# Patient Record
Sex: FEMALE | Race: WHITE | URBAN - METROPOLITAN AREA
[De-identification: names, ages, dates, MRNs, and addresses within clinical notes are randomized per-mention and may not be internally consistent; named-entity substitution may affect disease eponyms.]

---

## 2019-11-26 ENCOUNTER — OFFICE VISIT (OUTPATIENT)
Dept: INTERNAL MEDICINE CLINIC | Age: 29
End: 2019-11-26
Payer: COMMERCIAL

## 2019-11-26 VITALS
WEIGHT: 189 LBS | SYSTOLIC BLOOD PRESSURE: 130 MMHG | DIASTOLIC BLOOD PRESSURE: 100 MMHG | BODY MASS INDEX: 31.49 KG/M2 | HEIGHT: 65 IN

## 2019-11-26 DIAGNOSIS — E28.2 PCOS (POLYCYSTIC OVARIAN SYNDROME): ICD-10-CM

## 2019-11-26 DIAGNOSIS — B07.0 PLANTAR WARTS: ICD-10-CM

## 2019-11-26 DIAGNOSIS — E87.6 HYPOKALEMIA: ICD-10-CM

## 2019-11-26 DIAGNOSIS — E83.42 HYPOMAGNESEMIA: ICD-10-CM

## 2019-11-26 DIAGNOSIS — F41.9 ANXIETY: Primary | ICD-10-CM

## 2019-11-26 LAB
ANION GAP SERPL CALCULATED.3IONS-SCNC: 12 MMOL/L (ref 3–16)
BUN BLDV-MCNC: 10 MG/DL (ref 7–20)
CALCIUM SERPL-MCNC: 9.7 MG/DL (ref 8.3–10.6)
CHLORIDE BLD-SCNC: 105 MMOL/L (ref 99–110)
CO2: 25 MMOL/L (ref 21–32)
CREAT SERPL-MCNC: 0.7 MG/DL (ref 0.6–1.1)
GFR AFRICAN AMERICAN: >60
GFR NON-AFRICAN AMERICAN: >60
GLUCOSE BLD-MCNC: 91 MG/DL (ref 70–99)
MAGNESIUM: 1.9 MG/DL (ref 1.8–2.4)
POTASSIUM SERPL-SCNC: 4.4 MMOL/L (ref 3.5–5.1)
SODIUM BLD-SCNC: 142 MMOL/L (ref 136–145)

## 2019-11-26 PROCEDURE — G8482 FLU IMMUNIZE ORDER/ADMIN: HCPCS | Performed by: INTERNAL MEDICINE

## 2019-11-26 PROCEDURE — 1036F TOBACCO NON-USER: CPT | Performed by: INTERNAL MEDICINE

## 2019-11-26 PROCEDURE — G8417 CALC BMI ABV UP PARAM F/U: HCPCS | Performed by: INTERNAL MEDICINE

## 2019-11-26 PROCEDURE — G8427 DOCREV CUR MEDS BY ELIG CLIN: HCPCS | Performed by: INTERNAL MEDICINE

## 2019-11-26 PROCEDURE — 99203 OFFICE O/P NEW LOW 30 MIN: CPT | Performed by: INTERNAL MEDICINE

## 2019-11-26 RX ORDER — FLUOXETINE HYDROCHLORIDE 20 MG/1
20 CAPSULE ORAL DAILY
Qty: 14 CAPSULE | Refills: 0 | Status: SHIPPED | OUTPATIENT
Start: 2019-11-26 | End: 2020-01-30 | Stop reason: SINTOL

## 2019-11-26 RX ORDER — FLUOXETINE HYDROCHLORIDE 40 MG/1
40 CAPSULE ORAL DAILY
Qty: 30 CAPSULE | Refills: 3 | Status: SHIPPED | OUTPATIENT
Start: 2019-11-26 | End: 2020-01-30 | Stop reason: SINTOL

## 2019-11-26 ASSESSMENT — PATIENT HEALTH QUESTIONNAIRE - PHQ9
SUM OF ALL RESPONSES TO PHQ QUESTIONS 1-9: 0
1. LITTLE INTEREST OR PLEASURE IN DOING THINGS: 0
2. FEELING DOWN, DEPRESSED OR HOPELESS: 0
SUM OF ALL RESPONSES TO PHQ9 QUESTIONS 1 & 2: 0
SUM OF ALL RESPONSES TO PHQ QUESTIONS 1-9: 0

## 2020-01-30 ENCOUNTER — OFFICE VISIT (OUTPATIENT)
Dept: DERMATOLOGY | Age: 30
End: 2020-01-30
Payer: COMMERCIAL

## 2020-01-30 ENCOUNTER — OFFICE VISIT (OUTPATIENT)
Dept: INTERNAL MEDICINE CLINIC | Age: 30
End: 2020-01-30
Payer: COMMERCIAL

## 2020-01-30 VITALS
WEIGHT: 168 LBS | DIASTOLIC BLOOD PRESSURE: 80 MMHG | BODY MASS INDEX: 27.99 KG/M2 | SYSTOLIC BLOOD PRESSURE: 102 MMHG | HEIGHT: 65 IN

## 2020-01-30 PROBLEM — Z12.83 SKIN CANCER SCREENING: Status: ACTIVE | Noted: 2020-01-30

## 2020-01-30 PROBLEM — R52 TENDERNESS: Status: ACTIVE | Noted: 2020-01-30

## 2020-01-30 PROBLEM — B07.9 VERRUCA VULGARIS: Status: ACTIVE | Noted: 2020-01-30

## 2020-01-30 PROBLEM — D22.9 MULTIPLE BENIGN MELANOCYTIC NEVI: Status: ACTIVE | Noted: 2020-01-30

## 2020-01-30 PROCEDURE — 99214 OFFICE O/P EST MOD 30 MIN: CPT | Performed by: INTERNAL MEDICINE

## 2020-01-30 PROCEDURE — G8417 CALC BMI ABV UP PARAM F/U: HCPCS | Performed by: DERMATOLOGY

## 2020-01-30 PROCEDURE — 1036F TOBACCO NON-USER: CPT | Performed by: INTERNAL MEDICINE

## 2020-01-30 PROCEDURE — 17110 DESTRUCTION B9 LES UP TO 14: CPT | Performed by: DERMATOLOGY

## 2020-01-30 PROCEDURE — G8482 FLU IMMUNIZE ORDER/ADMIN: HCPCS | Performed by: DERMATOLOGY

## 2020-01-30 PROCEDURE — 1036F TOBACCO NON-USER: CPT | Performed by: DERMATOLOGY

## 2020-01-30 PROCEDURE — G8427 DOCREV CUR MEDS BY ELIG CLIN: HCPCS | Performed by: DERMATOLOGY

## 2020-01-30 PROCEDURE — G8427 DOCREV CUR MEDS BY ELIG CLIN: HCPCS | Performed by: INTERNAL MEDICINE

## 2020-01-30 PROCEDURE — G8417 CALC BMI ABV UP PARAM F/U: HCPCS | Performed by: INTERNAL MEDICINE

## 2020-01-30 PROCEDURE — 99203 OFFICE O/P NEW LOW 30 MIN: CPT | Performed by: DERMATOLOGY

## 2020-01-30 PROCEDURE — G8482 FLU IMMUNIZE ORDER/ADMIN: HCPCS | Performed by: INTERNAL MEDICINE

## 2020-01-30 RX ORDER — SUMATRIPTAN 50 MG/1
50 TABLET, FILM COATED ORAL
Qty: 9 TABLET | Refills: 2 | Status: SHIPPED | OUTPATIENT
Start: 2020-01-30 | End: 2020-07-21

## 2020-01-30 RX ORDER — MAGNESIUM OXIDE 400 MG/1
400 TABLET ORAL DAILY
Qty: 30 TABLET | Refills: 2 | Status: SHIPPED | OUTPATIENT
Start: 2020-01-30 | End: 2021-08-13

## 2020-01-30 RX ORDER — TRIAMCINOLONE ACETONIDE 1 MG/G
CREAM TOPICAL
Qty: 60 G | Refills: 2 | Status: SHIPPED | OUTPATIENT
Start: 2020-01-30 | End: 2021-08-13

## 2020-01-30 RX ORDER — AMITRIPTYLINE HYDROCHLORIDE 25 MG/1
25 TABLET, FILM COATED ORAL NIGHTLY
Qty: 30 TABLET | Refills: 5 | Status: SHIPPED | OUTPATIENT
Start: 2020-01-30 | End: 2020-09-16

## 2020-01-30 RX ORDER — METFORMIN HYDROCHLORIDE 500 MG/1
1000 TABLET, FILM COATED, EXTENDED RELEASE ORAL
COMMUNITY
End: 2021-08-13

## 2020-01-30 SDOH — ECONOMIC STABILITY: FOOD INSECURITY: WITHIN THE PAST 12 MONTHS, YOU WORRIED THAT YOUR FOOD WOULD RUN OUT BEFORE YOU GOT MONEY TO BUY MORE.: NEVER TRUE

## 2020-01-30 SDOH — ECONOMIC STABILITY: TRANSPORTATION INSECURITY
IN THE PAST 12 MONTHS, HAS LACK OF TRANSPORTATION KEPT YOU FROM MEETINGS, WORK, OR FROM GETTING THINGS NEEDED FOR DAILY LIVING?: NO

## 2020-01-30 SDOH — ECONOMIC STABILITY: FOOD INSECURITY: WITHIN THE PAST 12 MONTHS, THE FOOD YOU BOUGHT JUST DIDN'T LAST AND YOU DIDN'T HAVE MONEY TO GET MORE.: NEVER TRUE

## 2020-01-30 SDOH — ECONOMIC STABILITY: INCOME INSECURITY: HOW HARD IS IT FOR YOU TO PAY FOR THE VERY BASICS LIKE FOOD, HOUSING, MEDICAL CARE, AND HEATING?: NOT HARD AT ALL

## 2020-01-30 SDOH — ECONOMIC STABILITY: TRANSPORTATION INSECURITY
IN THE PAST 12 MONTHS, HAS THE LACK OF TRANSPORTATION KEPT YOU FROM MEDICAL APPOINTMENTS OR FROM GETTING MEDICATIONS?: NO

## 2020-01-30 ASSESSMENT — PATIENT HEALTH QUESTIONNAIRE - PHQ9
2. FEELING DOWN, DEPRESSED OR HOPELESS: 0
SUM OF ALL RESPONSES TO PHQ9 QUESTIONS 1 & 2: 0
1. LITTLE INTEREST OR PLEASURE IN DOING THINGS: 0
SUM OF ALL RESPONSES TO PHQ9 QUESTIONS 1 & 2: 0
2. FEELING DOWN, DEPRESSED OR HOPELESS: 0
SUM OF ALL RESPONSES TO PHQ QUESTIONS 1-9: 0
1. LITTLE INTEREST OR PLEASURE IN DOING THINGS: 0
SUM OF ALL RESPONSES TO PHQ QUESTIONS 1-9: 0

## 2020-01-30 NOTE — PROGRESS NOTES
2020     Nahun Bailey (:  1990) is a 30 y.o. female, here for evaluation of the following medical concerns:    Chief Complaint   Patient presents with    Anxiety     follow up    Headache     Patient wants to discuss migraines, getting worse        HPI    Anxiety -has been better going back on the Prozac. Still finds that she sometimes snaps at the children, occasionally feeling overwhelmed and tearful. Has never been on any other SSRI for anxiety. She has been struggling with daily migraines for at least the last month. She has restarted both the Prozac and metformin but had never had headaches with either 1 of those. Right now she is taking Excedrin Migraine multiple times a day, sometimes it does not help, and headache will rebound before she can take the next dose. Previously she was having migraines about once a month. She has not been sleeping well over the last week, the kids have been sick. Review of Systems   Neurological: Positive for headaches. Psychiatric/Behavioral: The patient is nervous/anxious. Prior to Visit Medications    Medication Sig Taking? Authorizing Provider   metFORMIN, MOD, (GLUMETZA) 500 MG extended release tablet Take 1,000 mg by mouth daily (with breakfast) Yes Historical Provider, MD   amitriptyline (ELAVIL) 25 MG tablet Take 1 tablet by mouth nightly Yes Marva Deutshc MD   SUMAtriptan (IMITREX) 50 MG tablet Take 1 tablet by mouth once as needed for Migraine Yes Marva Deutsch MD   sertraline (ZOLOFT) 50 MG tablet Take 1 tablet by mouth daily Yes Marva Deutsch MD   magnesium oxide (MAG-OX) 400 MG tablet Take 1 tablet by mouth daily Yes Marva Deutsch MD   triamcinolone (KENALOG) 0.1 % cream Apply to affected itchy area twice daily until improved.   Libertad Maria MD        Past Medical History:   Diagnosis Date    Anxiety     Depression     Infertility, female     PCOS    PCOS (polycystic ovarian syndrome)        Past Surgical History: Procedure Laterality Date    DILATION AND CURETTAGE OF UTERUS      WISDOM TOOTH EXTRACTION  2009       Social History     Tobacco Use    Smoking status: Never Smoker    Smokeless tobacco: Never Used   Substance Use Topics    Alcohol use: No        Family History   Problem Relation Age of Onset    Miscarriages / Stillbirths Sister     Diabetes Maternal Grandfather     Colon Cancer Maternal Grandfather     Cancer Paternal Grandfather        Vitals:    01/30/20 0809   BP: 102/80   Weight: 168 lb (76.2 kg)   Height: 5' 5\" (1.651 m)     Estimated body mass index is 27.96 kg/m² as calculated from the following:    Height as of this encounter: 5' 5\" (1.651 m). Weight as of this encounter: 168 lb (76.2 kg). Physical Exam  Vitals signs reviewed. Constitutional:       General: She is not in acute distress. Appearance: She is well-developed. HENT:      Head: Normocephalic and atraumatic. Cardiovascular:      Rate and Rhythm: Normal rate and regular rhythm. Heart sounds: Normal heart sounds. Pulmonary:      Effort: Pulmonary effort is normal. No respiratory distress. Breath sounds: Normal breath sounds. Musculoskeletal:      Right lower leg: No edema. Left lower leg: No edema. Skin:     General: Skin is warm and dry. Neurological:      General: No focal deficit present. Mental Status: She is alert. Psychiatric:         Behavior: Behavior normal.         Thought Content: Thought content normal.         Judgment: Judgment normal.         ASSESSMENT/PLAN:  1. Anxiety  -Improved. Discussed trying to increase the Prozac, but on further discussion it sounds like multiple family members have had good results with Zoloft so it may be better to switch to that. Will change to sertraline 50 mg once daily, I expect she may need a bit of a higher dose but will start there. Counseled on side effects.     2. Intractable migraine with status migrainosus, unspecified migraine type  -Not clear why her migraines suddenly increased, but at this point I think she is having significant medication overuse headaches.  -We will start a preventive, will start with amitriptyline 25 mg nightly. Okay to use a low-dose in conjunction with the SSRI, but will monitor for side effects. If she feels overly sedated she can cut it in half.  -She will need to stop Excedrin Migraine altogether, hopefully in about 2 weeks she will notice a significant improvement. Return in about 2 months (around 3/30/2020).

## 2020-01-30 NOTE — PROGRESS NOTES
[facility], [facility] Physicians    Previous clinic visit: none     CC:  mole check    HPI:    1.) Here today for FBSE. Denies new, changing, or sx nevi. No personal or family h/o skin cancers or atypical nevi. Feels well.     2.)  Complains of several painful warts on bottom of both feet of many mo duration. Has tried otc sal acid tx for months w/o resolution. Very tender. No current tx    Has 5 kids; 1 set of b/g twins! DERM HISTORY:   Personal history of NMSC or MM- no    Family history of NMSC or MM- no   Sunburns easily- Yes   Uses sunscreen- Yes  History of tanning bed use- Yes    ADDITIONAL HISTORY:    I have reviewed past medical and surgical histories, current medications, allergies, social and family histories as documented in the patient's electronic medical record. Current Outpatient Medications   Medication Sig Dispense Refill    metFORMIN, MOD, (GLUMETZA) 500 MG extended release tablet Take 1,000 mg by mouth daily (with breakfast)      amitriptyline (ELAVIL) 25 MG tablet Take 1 tablet by mouth nightly 30 tablet 5    SUMAtriptan (IMITREX) 50 MG tablet Take 1 tablet by mouth once as needed for Migraine 9 tablet 2    sertraline (ZOLOFT) 50 MG tablet Take 1 tablet by mouth daily 30 tablet 2    magnesium oxide (MAG-OX) 400 MG tablet Take 1 tablet by mouth daily 30 tablet 2     No current facility-administered medications for this visit.         ROS:    General: No fevers, chills, sweats, unexplained weight loss or weight gain, fatigue, malaise   Skin: Denies additional lesions    Heme: No history of bleeding diatheses    Allergy: Denies seasonal or environmental allergies or other medication allergies     PHYSICAL EXAM:    General: Well-appearing, NAD    Neurologic/psychiatric: Patient is AOx3; mood and affect are appropriate and congruent  Eyes: conjunctivae and eyelids without erythema, injection, or discharge   Integument: Examination was performed of the following and were unremarkable except as otherwise noted below:scalp, hair, face, ears, mucosa of gums/teeth/cutaneous and mucosal lips, buccal mucosa, oropharynx, neck, breast/axilla/chest, abdomen, groin, back, buttocks, RUE, LUE, RLE, LLE, digits/nails, apocrine/eccrine glands; genital exam deferred per pt request    Abnormalities noted include:    1.) Torso and extremities with several variably sized scattered brown to tan round smooth melanocytic macules and papules  2.)  L heel, R lateral foot, R great toe, and 1st interdigital webspace with 6 well defined hyperkeratotic scaly verrucous papules    ASSESSMENT AND PLAN:    1.)  Multiple scattered acquired melanocytic nevi with banal features:   - Reassurance  - Edu: patient regarding sun protection strategies, including use of broad spectrum sunscreen with SPF of at least 30, sun guard clothing, broad brimmed hat, sunglasses, and sun avoidance during peak hours of the day. ABCDE's of melanoma also reviewed    2.)  Verruca vulgaris, plantar feet (L heel, R lateral foot, R great toe, and 1st interdigital webspace):  - LN2 x 15 sec x 2 cycles x 6 lesions; edu: irritation, dyspigmentation, redness, possible recurrence, use of Vaseline until healed  - Advised to start OTC sal acid 40% pads to lesions; reinforce with water proof tape. Change pads every 2-3 days. Before application of new pad, gently exfoliate lesion with new pumice stone or denisse board, taking care to not spread virus by reusing same device  - Wart peel ordered      Return to Clinic:  2 weeks  Discussed plan with patient and/or primary caretaker. Patient to call clinic with any questions / concerns. Reviewed side effects of treatment(s) and/or medication(s) with patient and/or primary caretaker.    AVS provided or is available on [employer]   ____________________________________________________________________________   Ivonne Luther MD, MPH, FAAD  [facility], [address] [address]

## 2020-02-20 ENCOUNTER — OFFICE VISIT (OUTPATIENT)
Dept: DERMATOLOGY | Age: 30
End: 2020-02-20
Payer: COMMERCIAL

## 2020-02-20 PROCEDURE — 17110 DESTRUCTION B9 LES UP TO 14: CPT | Performed by: DERMATOLOGY

## 2020-02-20 NOTE — PROGRESS NOTES
[facility] Dermatology, [facility]) Physicians    Previous clinic visit: 2 weeks ago    CC:  wart follow up    HPI:    1)  Complains of several painful warts on bottom of both feet of many mo duration. Has tried otc sal acid tx for months w/o resolution. Very tender. Is s/p LN2 at last visit with sig improvement. Has 5 kids; 1 set of b/g twins! DERM HISTORY:   Personal history of NMSC or MM- no    Family history of NMSC or MM- no   Sunburns easily- Yes   Uses sunscreen- Yes  History of tanning bed use- Yes    ADDITIONAL HISTORY:    I have reviewed past medical and surgical histories, current medications, allergies, social and family histories as documented in the patient's electronic medical record. Current Outpatient Medications   Medication Sig Dispense Refill    metFORMIN, MOD, (GLUMETZA) 500 MG extended release tablet Take 1,000 mg by mouth daily (with breakfast)      amitriptyline (ELAVIL) 25 MG tablet Take 1 tablet by mouth nightly 30 tablet 5    sertraline (ZOLOFT) 50 MG tablet Take 1 tablet by mouth daily 30 tablet 2    magnesium oxide (MAG-OX) 400 MG tablet Take 1 tablet by mouth daily 30 tablet 2    triamcinolone (KENALOG) 0.1 % cream Apply to affected itchy area twice daily until improved. 60 g 2    SUMAtriptan (IMITREX) 50 MG tablet Take 1 tablet by mouth once as needed for Migraine 9 tablet 2     No current facility-administered medications for this visit.         ROS:    General: No fevers, chills, sweats, unexplained weight loss or weight gain, fatigue, malaise   Skin: Denies additional lesions    Heme: No history of bleeding diatheses    Allergy: Denies seasonal or environmental allergies or other medication allergies     PHYSICAL EXAM:    General: Well-appearing, NAD    Neurologic/psychiatric: Patient is AOx3; mood and affect are appropriate and congruent  Eyes: conjunctivae and eyelids without erythema, injection, or discharge   Integument: Examination was performed of the

## 2020-02-29 PROBLEM — Z12.83 SKIN CANCER SCREENING: Status: RESOLVED | Noted: 2020-01-30 | Resolved: 2020-02-29

## 2020-05-01 ENCOUNTER — TELEPHONE (OUTPATIENT)
Dept: DERMATOLOGY | Age: 30
End: 2020-05-01

## 2020-05-08 ENCOUNTER — TELEPHONE (OUTPATIENT)
Dept: INTERNAL MEDICINE CLINIC | Age: 30
End: 2020-05-08

## 2020-05-11 ENCOUNTER — VIRTUAL VISIT (OUTPATIENT)
Dept: INTERNAL MEDICINE CLINIC | Age: 30
End: 2020-05-11
Payer: COMMERCIAL

## 2020-05-11 VITALS — WEIGHT: 163 LBS | BODY MASS INDEX: 27.16 KG/M2 | HEIGHT: 65 IN

## 2020-05-11 PROBLEM — F41.9 ANXIETY: Status: ACTIVE | Noted: 2020-05-11

## 2020-05-11 PROBLEM — G43.911 INTRACTABLE MIGRAINE WITH STATUS MIGRAINOSUS: Status: ACTIVE | Noted: 2020-05-11

## 2020-05-11 PROCEDURE — 1036F TOBACCO NON-USER: CPT | Performed by: INTERNAL MEDICINE

## 2020-05-11 PROCEDURE — G8417 CALC BMI ABV UP PARAM F/U: HCPCS | Performed by: INTERNAL MEDICINE

## 2020-05-11 PROCEDURE — 99214 OFFICE O/P EST MOD 30 MIN: CPT | Performed by: INTERNAL MEDICINE

## 2020-05-11 PROCEDURE — G8427 DOCREV CUR MEDS BY ELIG CLIN: HCPCS | Performed by: INTERNAL MEDICINE

## 2020-05-11 RX ORDER — BUPROPION HYDROCHLORIDE 150 MG/1
150 TABLET ORAL EVERY MORNING
Qty: 90 TABLET | Refills: 0 | Status: SHIPPED | OUTPATIENT
Start: 2020-05-11 | End: 2020-10-20

## 2020-05-11 NOTE — PROGRESS NOTES
2020    TELEHEALTH EVALUATION -- Audio/Visual (During [id number] public health emergency)    HPI:    Viraj Jones (:  1990) has requested an audio/video evaluation for the following concern(s): Anxiety -she has noted improvement with the sertraline. She also is wondering whether there could be some underlying ADHD. Her daughter was diagnosed with ADHD recently and when she was prescribed medication [address] started doing some more reading about ADHD and has found that it describes a lot of how she has been since childhood. She knows that at least some of her symptoms are just anxiety but she a lot of problems with getting organized to start a project. It dramatically ramps up her anxiety. She cannot remember anything unless it is written down so she keeps lists and calendars with anything she needs to do. Once she is able to start a task she is able to complete it as long as she has not interrupted, but she finds that if she gets engrossed in a task and she is interrupted it ruins her whole day. She does think that she gets hyper focused. She never had any hyperactivity as a child. She is always had some difficulty with paying attention but it never affected her grades. She struggled a bit more in college. She was never evaluated for anything but her parents also never believed in medications. Migraines are better. Still not sleeping well but she is only had to use the triptan a couple of times in a month. Review of Systems   Psychiatric/Behavioral: Positive for sleep disturbance. The patient is nervous/anxious. Prior to Visit Medications    Medication Sig Taking?  Authorizing Provider   sertraline (ZOLOFT) 50 MG tablet TAKE 1 TABLET BY MOUTH EVERY DAY Yes Adrian Xie MD   metFORMIN, MOD, (GLUMETZA) 500 MG extended release tablet Take 1,000 mg by mouth daily (with breakfast) Yes Historical Provider, MD   amitriptyline (ELAVIL) 25 MG tablet Take 1 tablet by mouth nightly Yes Parveen Vyas MD   magnesium oxide (MAG-OX) 400 MG tablet Take 1 tablet by mouth daily Yes Parveen Vyas MD   buPROPion (WELLBUTRIN XL) 150 MG extended release tablet Take 1 tablet by mouth every morning  Parveen Vyas MD   SUMAtriptan (IMITREX) 50 MG tablet Take 1 tablet by mouth once as needed for Migraine  Parveen Vyas MD   triamcinolone (KENALOG) 0.1 % cream Apply to affected itchy area twice daily until improved. Patient not taking: Reported on 5/11/2020  Viri Rodriguez MD       Social History     Tobacco Use    Smoking status: Never Smoker    Smokeless tobacco: Never Used   Substance Use Topics    Alcohol use: No    Drug use: No            PHYSICAL EXAMINATION:  [ INSTRUCTIONS:  \"[x]\" Indicates a positive item  \"[]\" Indicates a negative item  -- DELETE ALL ITEMS NOT EXAMINED]  Vital Signs: (As obtained by patient/caregiver or practitioner observation)    Blood pressure-  Heart rate-    Respiratory rate-    Temperature-  Pulse oximetry-     Constitutional: [x] Appears well-developed and well-nourished [x] No apparent distress      [] Abnormal-   Mental status  [x] Alert and awake  [x] Oriented to person/place/time [x]Able to follow commands      Eyes:  EOM    []  Normal  [] Abnormal-  Sclera  [x]  Normal  [] Abnormal -         Discharge [x]  None visible  [] Abnormal -    HENT:   [x] Normocephalic, atraumatic.   [] Abnormal   [x] Mouth/Throat: Mucous membranes are moist.     External Ears [x] Normal  [] Abnormal-     Neck: [x] No visualized mass     Pulmonary/Chest: [x] Respiratory effort normal.  [x] No visualized signs of difficulty breathing or respiratory distress        [] Abnormal-      Musculoskeletal:   [] Normal gait with no signs of ataxia         [] Normal range of motion of neck        [] Abnormal-       Neurological:        [x] No Facial Asymmetry (Cranial nerve 7 motor function) (limited exam to video visit)          [] No gaze palsy        [] Abnormal-         Skin:        [x] No significant

## 2020-07-21 RX ORDER — SUMATRIPTAN 50 MG/1
50 TABLET, FILM COATED ORAL
Qty: 9 TABLET | Refills: 2 | Status: SHIPPED | OUTPATIENT
Start: 2020-07-21 | End: 2021-01-15

## 2020-09-16 RX ORDER — AMITRIPTYLINE HYDROCHLORIDE 25 MG/1
TABLET, FILM COATED ORAL
Qty: 30 TABLET | Refills: 5 | Status: SHIPPED | OUTPATIENT
Start: 2020-09-16 | End: 2021-03-22

## 2020-09-19 ENCOUNTER — NURSE TRIAGE (OUTPATIENT)
Dept: OTHER | Facility: CLINIC | Age: 30
End: 2020-09-19

## 2020-09-19 NOTE — TELEPHONE ENCOUNTER
Reason for Disposition   [1] Symptoms of pill stuck in throat or esophagus (e.g., pain in throat or chest, FB sensation) AND [2] no relief after using CARE ADVICE    Answer Assessment - Initial Assessment Questions  1. ONSET: \"When did the throat start hurting? \" (Hours or days ago)       24 hours ago  2. SEVERITY: \"How bad is the sore throat? \" (Scale 1-10; mild, moderate or severe)    - MILD (1-3):  doesn't interfere with eating or normal activities    - MODERATE (4-7): interferes with eating some solids and normal activities    - SEVERE (8-10):  excruciating pain, interferes with most normal activities    - SEVERE DYSPHAGIA: can't swallow liquids, drooling      moderate  3. STREP EXPOSURE: \"Has there been any exposure to strep within the past week? \" If so, ask: \"What type of contact occurred? \"       no  4. VIRAL SYMPTOMS: [place] there any symptoms of a cold, such as a runny nose, cough, hoarse voice or red eyes? \"       denies  5. FEVER: \"Do you have a fever? \" If so, ask: \"What is your temperature, how was it measured, and when did it start? \"     denies  6. PUS ON THE TONSILS: \"Is there pus on the tonsils in the back of your throat? \"      denies  7. OTHER SYMPTOMS: \"Do you have any other symptoms? \" (e.g., difficulty breathing, headache, rash)     Difficulty swallowing, pain with swallowing, base of throat is painful to touch  8. PREGNANCY: \"Is there any chance you are pregnant? \" \"When was your last menstrual period? \"      n/a    Answer Assessment - Initial Assessment Questions  1. SYMPTOM: [place] you having difficulty swallowing liquids, solids, or both? \"      both  2. ONSET: \"When did the swallowing problems begin? \"       24 hours ago  3. CAUSE: \"What do you think is causing the problem? \"       unknown  4. CHRONIC/RECURRENT: \"Is this a new problem for you? \"  If no, ask: \"How long have you had this problem? \" (e.g., days, weeks, months)       no  5. OTHER SYMPTOMS: \"Do you have any other symptoms? \" (e.g., difficulty breathing, sore throat, swollen tongue, chest pain)      Soreness of throat  and base of throat sore to touch  6. PREGNANCY: \"Is there any chance you are pregnant? \" \"When was your last menstrual period? \"     n/a    Protocols used: SWALLOWING DIFFICULTY-ADULT-, SORE THROAT-ADULT-AH

## 2020-10-13 ENCOUNTER — OFFICE VISIT (OUTPATIENT)
Dept: DERMATOLOGY | Age: 30
End: 2020-10-13
Payer: COMMERCIAL

## 2020-10-13 VITALS — TEMPERATURE: 97.2 F

## 2020-10-13 PROCEDURE — 11057 PARNG/CUTG B9 HYPRKR LES >4: CPT | Performed by: DERMATOLOGY

## 2020-10-13 PROCEDURE — 99213 OFFICE O/P EST LOW 20 MIN: CPT | Performed by: DERMATOLOGY

## 2020-10-13 PROCEDURE — 17110 DESTRUCTION B9 LES UP TO 14: CPT | Performed by: DERMATOLOGY

## 2020-10-13 RX ORDER — UREA 40 %
CREAM (GRAM) TOPICAL
Qty: 227 G | Refills: 2 | Status: SHIPPED | OUTPATIENT
Start: 2020-10-13 | End: 2021-08-13

## 2020-10-13 NOTE — PATIENT INSTRUCTIONS
Cryosurgery (Liquid Nitrogen Treatment): Aftercare instructions    Cryosurgery is the use of a cold, liquefied gas to remove superficial skin growths such as warts and keratoses. This liquefied gas is cooled to around 300? below 0? F. When it comes in contact with a skin growth, the growth is frozen and should fall away or shrink. What to expect:  After treatment, your skin will become red and swollen and may develop a blister or scab. Healing takes 1 to 3 weeks, after which the skin may look perfectly normal or may be slightly lighter in color. Sometimes the growth will not disappear completely and multiple treatments may be needed. This is especially true for warts. Proper care after the procedure:    Clean the treated area with soap and water as you normally would. It is fine to get the area wet; however, be sure to blot it dry carefully and delicately.  If necessary, you can open the blister with a sterilized needle.  If a scab or small wound forms, apply a thin layer of Vaseline/Aquaphor ointment to the site twice daily.  If the site is prone to irritation, cover it with a bandage.  You will likely see a crust form after a few days. This crust will fall off on its own.

## 2020-10-13 NOTE — PROGRESS NOTES
Patient's Name: Paul Mahmood  MRN: <[id number]>  YOB: 1990  Date of Visit: 10/13/2020  Primary Care Provider: Rajiv Britt MD  Referring Provider: No ref. provider found    Subjective:     Chief Complaint   Patient presents with    Verruca Vulgaris     bilateral foot and right middle finger        History of Present Illness:  Paul Mahmood is a 26 y.o. female is a new patient to my clinic, but has been previously evaluated by Dr. Jared Britton on 2/20/20. She presents today for evaluation of warts on her feet and finger. What is it: warts  Signs and symptoms: increasing thickness, increasing number of lesions and pain  Location: bottom feet and Rt middle finger  Severity: Severity now is 10/10. Modifying factors: Things that make this condition worse are unsure. Things that make this condition better are cryotherapy . Duration: This lesion has been present for several months. Current treatment: none  Previous treatment: salicylic acid as well as cryotherapy        Past Dermatologic History:  Personal history of non melanoma skin cancer: no  Personal history of melanoma: no  Personal history of abnormal/dysplastic moles: no  Personal history of tanning bed use current: no  Personal history of tanning bed use: No  Personal history of blistering sunburns: No  Personal history of extensive sun exposure: No  Burns easily: No  Practicing sun protective habits:  Yes using  sunscreen SPF  50     Social History:   Occupation Current or Former: part time job as dental hygienist   Marital status:   Smoking Status: Never smoker  Children: Yes 3 Daughters  2 Sons    Type of outdoor activities if any : swimming, soccer and softball:       Family Skin Disease History:  Patient denies  a family history of non melanoma skin cancer. Patient denies  a family history of abnormal moles  Patient denies  an immediate family history of melanoma.      Past Medical History:  Past Medical History:   Diagnosis Date    Anxiety     Depression     Infertility, female     PCOS    PCOS (polycystic ovarian syndrome)        Past Surgical History:  Past Surgical History:   Procedure Laterality Date    DILATION AND CURETTAGE OF UTERUS      WISDOM TOOTH EXTRACTION  2009       Past Family History:  Family History   Problem Relation Age of Onset   [de-identified] / Stillbirths Sister     Diabetes Maternal Grandfather     Colon Cancer Maternal Grandfather     Cancer Paternal Grandfather        Allergies:  No Known Allergies    Current Medications:  Current Outpatient Medications   Medication Sig Dispense Refill    amitriptyline (ELAVIL) 25 MG tablet TAKE 1 TABLET BY MOUTH EVERY DAY AT NIGHT 30 tablet 5    sertraline (ZOLOFT) 50 MG tablet TAKE 1 AND 1/2 TABLETS BY MOUTH EVERY DAY 45 tablet 2    buPROPion (WELLBUTRIN XL) 150 MG extended release tablet Take 1 tablet by mouth every morning 90 tablet 0    metFORMIN, MOD, (GLUMETZA) 500 MG extended release tablet Take 1,000 mg by mouth daily (with breakfast)      magnesium oxide (MAG-OX) 400 MG tablet Take 1 tablet by mouth daily 30 tablet 2    triamcinolone (KENALOG) 0.1 % cream Apply to affected itchy area twice daily until improved. 60 g 2    SUMAtriptan (IMITREX) 50 MG tablet TAKE 1 TABLET BY MOUTH ONCE AS NEEDED FOR MIGRAINE 9 tablet 2     No current facility-administered medications for this visit. Review of Systems:  Constitutional: No fevers, chills or recent illness.    Skin: Skin:As per HPI AND otherwise no new, bleeding or symptomatic skin lesions      Objective:     Vitals:    10/13/20 1215   Temp: 97.2 °F (36.2 °C)   TempSrc: Temporal     Physical Examination:  General: alert, comfortable, no apparent distress, well-appearing  Psych: alert, oriented and pleasant  Neuro: oriented to person, place, and time  Skin: Areas examined: head including face, conjunctiva and lids, neck, hair/scalp, left hand, right hand, digits and nails, Right foot, Left foot and toe nails      All areas examined were within normal limits except those listed below with the appropriate assessment and plan    Assessment and Plan (with relevant objective exam findings):     1. Verruca vulgaris and Plantar warts    Location:  Left heel, plantar Rt great toe and 1st webspace, Rt lateral foot, and radial aspect of middle finger tip  Objective: several exophytic, verrucous, with dilated capillary loops, rough and scaly hyperkeratotic flesh-colored papules with disruption of skin markings. Discussed that warts are caused by a virus and can be spread with trauma to skin or when the skin is wet. Warts may disappear without treatment in some cases. Discussed treatments that include destructive therapies (freezing, cantheridin), keratolytics (MediPlast, salicylic acid), immunotherapy (Aldara or Candida), DNCB 1% 1-2x/week to warts after sensitization, and possible oral therapy such as cimetidine or L-methionine. It is likely that several treatment types and several sessions will be needed for complete clearance. After discussion of treatment options, the patient opted for:  -Cryotherapy. See procedure below.    -Curettage was performed before cryotherapy. - patient to use urea cream daily on hyperkeratotic lesion    Follow up 1 month for re-evaluation and further therapy if needed. 2. Multiple benign melanocytic nevi   Location: extremities  Objective findings: multiple brown/pink macules and papules without abnormal findings or concerning findings on exam and dermatoscopy    -Counseled the patient that these are benign and need no therapy. Observation for any changes recommended         Follow up:  Return visit in 4 weeks or as needed for change in condition. All questions addressed.      Procedure:   Procedure: Cryotherapy  Location(s):  Left heel, plantar Rt great toe and 1st webspace, Radial aspect of middle finger tip  Indication:  Verruca vulgaris and plantaris  Total Number of Lesions:  5    Risks, benefits and alternatives were explained and verbal informed consent was obtained. The area was not photographed. Each lesion was treated with cryotherapy, using liquid nitrogen, for 2 freeze cycles of approximately 10 seconds each. Skin was allowed to thaw completely before each cycle. The patient was advised to have wound examined if problems with healing occur, or evidence of infection develops. Wound care instructions were reviewed with the patient and She was advised that if the site is not completely resolved at follow-up, re-treatment with this or other method should be considered. The patient tolerated the procedure well without complications.                     Michael Carvajal MD, MS

## 2020-10-20 ENCOUNTER — OFFICE VISIT (OUTPATIENT)
Dept: INTERNAL MEDICINE CLINIC | Age: 30
End: 2020-10-20
Payer: COMMERCIAL

## 2020-10-20 VITALS
SYSTOLIC BLOOD PRESSURE: 124 MMHG | HEART RATE: 123 BPM | OXYGEN SATURATION: 98 % | WEIGHT: 179.6 LBS | TEMPERATURE: 96.9 F | BODY MASS INDEX: 29.92 KG/M2 | HEIGHT: 65 IN | DIASTOLIC BLOOD PRESSURE: 86 MMHG

## 2020-10-20 DIAGNOSIS — R10.13 EPIGASTRIC PAIN: ICD-10-CM

## 2020-10-20 DIAGNOSIS — R19.7 DIARRHEA, UNSPECIFIED TYPE: ICD-10-CM

## 2020-10-20 LAB
A/G RATIO: 1.5 (ref 1.1–2.2)
ALBUMIN SERPL-MCNC: 4.3 G/DL (ref 3.4–5)
ALP BLD-CCNC: 79 U/L (ref 40–129)
ALT SERPL-CCNC: 9 U/L (ref 10–40)
ANION GAP SERPL CALCULATED.3IONS-SCNC: 13 MMOL/L (ref 3–16)
AST SERPL-CCNC: 12 U/L (ref 15–37)
BASOPHILS ABSOLUTE: 0.1 K/UL (ref 0–0.2)
BASOPHILS RELATIVE PERCENT: 0.7 %
BILIRUB SERPL-MCNC: 0.3 MG/DL (ref 0–1)
BUN BLDV-MCNC: 10 MG/DL (ref 7–20)
CALCIUM SERPL-MCNC: 9.7 MG/DL (ref 8.3–10.6)
CHLORIDE BLD-SCNC: 98 MMOL/L (ref 99–110)
CO2: 25 MMOL/L (ref 21–32)
CREAT SERPL-MCNC: 0.7 MG/DL (ref 0.6–1.1)
EOSINOPHILS ABSOLUTE: 0.1 K/UL (ref 0–0.6)
EOSINOPHILS RELATIVE PERCENT: 0.9 %
GFR AFRICAN AMERICAN: >60
GFR NON-AFRICAN AMERICAN: >60
GLOBULIN: 2.8 G/DL
GLUCOSE BLD-MCNC: 110 MG/DL (ref 70–99)
HCT VFR BLD CALC: 39.4 % (ref 36–48)
HEMOGLOBIN: 13.2 G/DL (ref 12–16)
LIPASE: 41 U/L (ref 13–60)
LYMPHOCYTES ABSOLUTE: 2.3 K/UL (ref 1–5.1)
LYMPHOCYTES RELATIVE PERCENT: 21.9 %
MCH RBC QN AUTO: 28.4 PG (ref 26–34)
MCHC RBC AUTO-ENTMCNC: 33.6 G/DL (ref 31–36)
MCV RBC AUTO: 84.6 FL (ref 80–100)
MONOCYTES ABSOLUTE: 0.4 K/UL (ref 0–1.3)
MONOCYTES RELATIVE PERCENT: 3.8 %
NEUTROPHILS ABSOLUTE: 7.7 K/UL (ref 1.7–7.7)
NEUTROPHILS RELATIVE PERCENT: 72.7 %
PDW BLD-RTO: 13.7 % (ref 12.4–15.4)
PLATELET # BLD: 382 K/UL (ref 135–450)
PMV BLD AUTO: 7.5 FL (ref 5–10.5)
POTASSIUM SERPL-SCNC: 3.7 MMOL/L (ref 3.5–5.1)
RBC # BLD: 4.65 M/UL (ref 4–5.2)
SODIUM BLD-SCNC: 136 MMOL/L (ref 136–145)
TOTAL PROTEIN: 7.1 G/DL (ref 6.4–8.2)
TSH REFLEX: 1.43 UIU/ML (ref 0.27–4.2)
WBC # BLD: 10.6 K/UL (ref 4–11)

## 2020-10-20 PROCEDURE — 90471 IMMUNIZATION ADMIN: CPT | Performed by: INTERNAL MEDICINE

## 2020-10-20 PROCEDURE — G8417 CALC BMI ABV UP PARAM F/U: HCPCS | Performed by: INTERNAL MEDICINE

## 2020-10-20 PROCEDURE — G8427 DOCREV CUR MEDS BY ELIG CLIN: HCPCS | Performed by: INTERNAL MEDICINE

## 2020-10-20 PROCEDURE — 99214 OFFICE O/P EST MOD 30 MIN: CPT | Performed by: INTERNAL MEDICINE

## 2020-10-20 PROCEDURE — 1036F TOBACCO NON-USER: CPT | Performed by: INTERNAL MEDICINE

## 2020-10-20 PROCEDURE — G8482 FLU IMMUNIZE ORDER/ADMIN: HCPCS | Performed by: INTERNAL MEDICINE

## 2020-10-20 PROCEDURE — 90688 IIV4 VACCINE SPLT 0.5 ML IM: CPT | Performed by: INTERNAL MEDICINE

## 2020-10-20 RX ORDER — FLUOXETINE HYDROCHLORIDE 20 MG/1
40 CAPSULE ORAL DAILY
Qty: 60 CAPSULE | Refills: 3 | Status: SHIPPED | OUTPATIENT
Start: 2020-10-20 | End: 2021-02-12

## 2020-10-20 RX ORDER — TRAZODONE HYDROCHLORIDE 50 MG/1
50 TABLET ORAL NIGHTLY PRN
Qty: 30 TABLET | Refills: 1 | Status: SHIPPED | OUTPATIENT
Start: 2020-10-20 | End: 2020-12-16

## 2020-10-20 ASSESSMENT — ENCOUNTER SYMPTOMS
CONSTIPATION: 0
ABDOMINAL PAIN: 1
DIARRHEA: 1

## 2020-10-20 NOTE — PROGRESS NOTES
10/20/2020     Paul Mahmood (:  1990) is a 26 y.o. female, here for evaluation of the following medical concerns:    Chief Complaint   Patient presents with    Dysphagia        HPI    She had an episode when she had continuous substernal pain, felt like she couldn't swallow. Lasted until the following day. Went to urgent care, given antibiotics and a shot of steroids, symptoms finally improved. She still has some episodes. Tried omeprazole and didn't help. Intermittent substernal pain. Doesn't radiate. No nausea related to it. Type of food doesn't seem to help. Evening is worse. Sometimes can wake out of sleep. Sometimes lasts hours, sometimes 30 min. Felt like something caught in throat for about 24 hrs, got steroid shot and augmentin. No dysphagia except that one time. Happening weekly. She has some diarrhea, this tends to be worse when her anxiety is high. That has gone on for years. Anxiety - medication doesn't seem to be helping at this point. Taking sertraline 75mg daily. She thinks the Prozac was helping more. Concerned about side effects from that, but anxiety has been interfering with functioning. Also having trouble sleeping. Amitriptyline is still helping with her headaches but not helping with sleep. Review of Systems   Gastrointestinal: Positive for abdominal pain and diarrhea. Negative for constipation. Prior to Visit Medications    Medication Sig Taking? Authorizing Provider   FLUoxetine (PROZAC) 20 MG capsule Take 2 capsules by mouth daily Yes Rajiv Britt MD   traZODone (DESYREL) 50 MG tablet Take 1 tablet by mouth nightly as needed for Sleep Yes Rajiv Britt MD   Urea (CARMOL) 40 % cream Apply to affected areas on the feet once or twice daily.  Yes Sam Cope MD   amitriptyline (ELAVIL) 25 MG tablet TAKE 1 TABLET BY MOUTH EVERY DAY AT NIGHT Yes Rajiv Britt MD   metFORMIN, MOD, (GLUMETZA) 500 MG extended release tablet Take 1,000 mg by mouth daily (with breakfast) Yes Historical Provider, MD   magnesium oxide (MAG-OX) 400 MG tablet Take 1 tablet by mouth daily Yes Wyatt Malloy MD   triamcinolone (KENALOG) 0.1 % cream Apply to affected itchy area twice daily until improved. Yes Laly Madsen MD   SUMAtriptan (IMITREX) 50 MG tablet TAKE 1 TABLET BY MOUTH ONCE AS NEEDED FOR MIGRAINE  Wyatt Malloy MD        Past Medical History:   Diagnosis Date    Anxiety     Depression     Infertility, female     PCOS    PCOS (polycystic ovarian syndrome)        Past Surgical History:   Procedure Laterality Date    DILATION AND CURETTAGE OF UTERUS      WISDOM TOOTH EXTRACTION  2009       Social History     Tobacco Use    Smoking status: Never Smoker    Smokeless tobacco: Never Used   Substance Use Topics    Alcohol use: No        Family History   Problem Relation Age of Onset    Miscarriages / Stillbirths Sister     Diabetes Maternal Grandfather     Colon Cancer Maternal Grandfather     Cancer Paternal Grandfather        Vitals:    10/20/20 1433   BP: 124/86   Site: Left Upper Arm   Pulse: 123   Temp: 96.9 °F (36.1 °C)   SpO2: 98%   Weight: 179 lb 9.6 oz (81.5 kg)   Height: 5' 5\" (1.651 m)     Estimated body mass index is 29.89 kg/m² as calculated from the following:    Height as of this encounter: 5' 5\" (1.651 m). Weight as of this encounter: 179 lb 9.6 oz (81.5 kg). Physical Exam  Vitals signs reviewed. Constitutional:       General: She is not in acute distress. Appearance: She is well-developed. HENT:      Head: Normocephalic and atraumatic. Cardiovascular:      Rate and Rhythm: Normal rate and regular rhythm. Heart sounds: Normal heart sounds. Pulmonary:      Effort: Pulmonary effort is normal. No respiratory distress. Breath sounds: Normal breath sounds. Abdominal:      General: Bowel sounds are normal.      Palpations: Abdomen is soft. Tenderness: There is abdominal tenderness in the epigastric area. There is no guarding or rebound. Skin:     General: Skin is warm and dry. Neurological:      Mental Status: She is alert and oriented to person, place, and time. Psychiatric:         Attention and Perception: Attention and perception normal.         Mood and Affect: Mood is anxious. Speech: Speech normal.         Behavior: Behavior normal.         Thought Content: Thought content normal.         Judgment: Judgment normal.         ASSESSMENT/PLAN:  1. Epigastric pain  - unclear what caused the episode. She has had no further episodes to suggest ongoing esophagitis. Frequently symptoms are caused by reflux esophagitis but lack of clear triggers and lack of response to omeprazole does not support that. - refer to GI  - check labs  - Comprehensive Metabolic Panel; Future  - Lipase; Future  - CBC Auto Differential; Future  - AFL - Haley Bergman MD, Gastroenterology, [facility]    2. Diarrhea, unspecified type  - suspect IBS based on history, needs further investigation  - TSH with Reflex; Future  - Dane Kim MD, Gastroenterology, [facility]    3. Anxiety  - change back to fluoxetine 40mg daily  - anticipate increasing to 60mg fairly quickly since symptoms previously were not well-controlled with 40mg. - consider escitalopram if not tolerating fluoxetine    4. Need for influenza vaccination  - INFLUENZA, QUADV, 3 YRS AND OLDER, IM, MDV, 0.5ML (Ab Herrera)      Return in about 2 months (around 12/20/2020) for anxiety.

## 2020-10-20 NOTE — PROGRESS NOTES
Vaccine Information Sheet, \"Influenza - Inactivated\"  given to Rosa M Finch, or parent/legal guardian of  Rosa M Finch and verbalized understanding. Patient responses:    Have you ever had a reaction to a flu vaccine? No  Do you have any current illness? No  Have you ever had Guillian Dupont Syndrome? No  Do you have a serious allergy to any of the follow: Neomycin, Polymyxin, Thimerosal, eggs or egg products? No    Flu vaccine given per order. Please see immunization tab. Risks and benefits explained. Current VIS given.

## 2020-11-05 ENCOUNTER — TELEPHONE (OUTPATIENT)
Dept: INTERNAL MEDICINE CLINIC | Age: 30
End: 2020-11-05

## 2020-11-09 ENCOUNTER — OFFICE VISIT (OUTPATIENT)
Dept: DERMATOLOGY | Age: 30
End: 2020-11-09
Payer: COMMERCIAL

## 2020-11-09 VITALS — TEMPERATURE: 97.2 F

## 2020-11-09 PROCEDURE — 99213 OFFICE O/P EST LOW 20 MIN: CPT | Performed by: DERMATOLOGY

## 2020-11-09 PROCEDURE — 17110 DESTRUCTION B9 LES UP TO 14: CPT | Performed by: DERMATOLOGY

## 2020-11-09 PROCEDURE — 11057 PARNG/CUTG B9 HYPRKR LES >4: CPT | Performed by: DERMATOLOGY

## 2020-11-09 NOTE — PROGRESS NOTES
Patient's Name: Jasen Ashley  MRN: <[id number]>  YOB: 1990  Date of Visit: 11/9/2020  Primary Care Provider: Jeffrey Luevano MD    Subjective:     Chief Complaint   Patient presents with    Other     LN2, hand and feet        History of Present Illness:  Jasen Ashley is a 26 y.o. female is an established patient who presents in clinic today for skin examination and evaluation of  Warts. Patient was last evaluated on 10/13/20. At their last visit they were prescribed urea cream to be applied to hyperkeratotic lesion on her Rt plantar foot as well as compounded Wartpeel. Patient states she never received a call about her medication. Patient reports some improvement of their finger lesion since last visit. She reports minimal change to the other lesions. Past medical/surgical/family history reviewed with no changes since last visit on 10/13/20    Past Medical History:  Past Medical History:   Diagnosis Date    Anxiety     Depression     Infertility, female     PCOS    PCOS (polycystic ovarian syndrome)        Past Surgical History:  Past Surgical History:   Procedure Laterality Date    DILATION AND CURETTAGE OF UTERUS      WISDOM TOOTH EXTRACTION  2009       Past Family History:  Family History   Problem Relation Age of Onset    Miscarriages / Stillbirths Sister     Diabetes Maternal Grandfather     Colon Cancer Maternal Grandfather     Cancer Paternal Grandfather          Allergies:  No Known Allergies    Current Medications:  Current Outpatient Medications   Medication Sig Dispense Refill    FLUoxetine (PROZAC) 20 MG capsule Take 2 capsules by mouth daily 60 capsule 3    traZODone (DESYREL) 50 MG tablet Take 1 tablet by mouth nightly as needed for Sleep 30 tablet 1    Urea (CARMOL) 40 % cream Apply to affected areas on the feet once or twice daily.  227 g 2    amitriptyline (ELAVIL) 25 MG tablet TAKE 1 TABLET BY MOUTH EVERY DAY AT NIGHT 30 tablet 5    metFORMIN, MOD, (GLUMETZA) 500 MG extended release tablet Take 1,000 mg by mouth daily (with breakfast)      magnesium oxide (MAG-OX) 400 MG tablet Take 1 tablet by mouth daily 30 tablet 2    triamcinolone (KENALOG) 0.1 % cream Apply to affected itchy area twice daily until improved. 60 g 2    SUMAtriptan (IMITREX) 50 MG tablet TAKE 1 TABLET BY MOUTH ONCE AS NEEDED FOR MIGRAINE 9 tablet 2     No current facility-administered medications for this visit. Review of Systems:  Constitutional: No fevers, chills or recent illness. feels well   Skin: Skin:As per HPI AND otherwise no new, bleeding or symptomatic skin lesions      Objective:     Vitals:    11/09/20 0955   Temp: 97.2 °F (36.2 °C)       Physical Examination:  General: alert, comfortable, no apparent distress, well-appearing  Psych: alert, oriented and pleasant  Neuro: oriented to person, place, and time  Skin: Areas examined: head including face, lips, conjunctiva and lids, neck, hair/scalp, left hand, right hand, digits and nails, Right foot, Left foot and toe nails      All areas examined were within normal limits except those listed below with the appropriate assessment and plan    Assessment and Plan (with relevant objective exam findings):     1. Verruca vulgaris and Plantar warts    Location: Left heel, plantar Rt great toes and 1st webspace, Rt lateral plantar foot and radial aspect of middle finger tip  Objective: endophytic, verrucous, with dilated capillary loops, rough and scaly hyperkeratotic flesh-colored papules with disruption of skin markings. After discussion of treatment options, the patient opted for:  -Cryotherapy. See procedure below.    -Curettage was performed before cryotherapy. WartPeel(compounded) from [facility], apply daily at night under occlusion (refaxed prescription)    Follow up in 6 weeks for re-evaluation and further therapy if needed.       2. Post-inflammatory hyperpigmentation  Location: 1st webspace of Rt

## 2020-11-16 ENCOUNTER — TELEPHONE (OUTPATIENT)
Dept: INTERNAL MEDICINE CLINIC | Age: 30
End: 2020-11-16

## 2020-12-08 ENCOUNTER — TELEPHONE (OUTPATIENT)
Dept: INTERNAL MEDICINE CLINIC | Age: 30
End: 2020-12-08

## 2020-12-08 RX ORDER — HYDROCORTISONE 25 MG/G
CREAM TOPICAL
Qty: 28 G | Refills: 0 | Status: SHIPPED | OUTPATIENT
Start: 2020-12-08 | End: 2021-08-13

## 2020-12-16 RX ORDER — TRAZODONE HYDROCHLORIDE 50 MG/1
TABLET ORAL
Qty: 30 TABLET | Refills: 1 | Status: SHIPPED | OUTPATIENT
Start: 2020-12-16 | End: 2021-01-13

## 2021-01-13 RX ORDER — TRAZODONE HYDROCHLORIDE 50 MG/1
TABLET ORAL
Qty: 30 TABLET | Refills: 1 | Status: SHIPPED | OUTPATIENT
Start: 2021-01-13 | End: 2021-02-12

## 2021-01-15 RX ORDER — SUMATRIPTAN 50 MG/1
50 TABLET, FILM COATED ORAL
Qty: 9 TABLET | Refills: 2 | Status: SHIPPED | OUTPATIENT
Start: 2021-01-15 | End: 2021-04-12

## 2021-02-12 RX ORDER — FLUOXETINE HYDROCHLORIDE 20 MG/1
CAPSULE ORAL
Qty: 60 CAPSULE | Refills: 3 | Status: SHIPPED | OUTPATIENT
Start: 2021-02-12 | End: 2021-05-14

## 2021-02-12 RX ORDER — TRAZODONE HYDROCHLORIDE 50 MG/1
TABLET ORAL
Qty: 30 TABLET | Refills: 1 | Status: SHIPPED | OUTPATIENT
Start: 2021-02-12 | End: 2021-03-15

## 2021-02-17 ENCOUNTER — OFFICE VISIT (OUTPATIENT)
Dept: DERMATOLOGY | Age: 31
End: 2021-02-17
Payer: COMMERCIAL

## 2021-02-17 VITALS — TEMPERATURE: 97.2 F

## 2021-02-17 DIAGNOSIS — D22.9 MULTIPLE BENIGN MELANOCYTIC NEVI: ICD-10-CM

## 2021-02-17 DIAGNOSIS — D48.5 NEOPLASM OF UNCERTAIN BEHAVIOR OF SKIN: ICD-10-CM

## 2021-02-17 DIAGNOSIS — B07.0 VERRUCA PLANTARIS: Primary | ICD-10-CM

## 2021-02-17 PROCEDURE — 1036F TOBACCO NON-USER: CPT | Performed by: DERMATOLOGY

## 2021-02-17 PROCEDURE — 11401 EXC TR-EXT B9+MARG 0.6-1 CM: CPT | Performed by: DERMATOLOGY

## 2021-02-17 PROCEDURE — G8427 DOCREV CUR MEDS BY ELIG CLIN: HCPCS | Performed by: DERMATOLOGY

## 2021-02-17 PROCEDURE — G8482 FLU IMMUNIZE ORDER/ADMIN: HCPCS | Performed by: DERMATOLOGY

## 2021-02-17 PROCEDURE — G8417 CALC BMI ABV UP PARAM F/U: HCPCS | Performed by: DERMATOLOGY

## 2021-02-17 PROCEDURE — 11057 PARNG/CUTG B9 HYPRKR LES >4: CPT | Performed by: DERMATOLOGY

## 2021-02-17 PROCEDURE — 17110 DESTRUCTION B9 LES UP TO 14: CPT | Performed by: DERMATOLOGY

## 2021-02-17 PROCEDURE — 99214 OFFICE O/P EST MOD 30 MIN: CPT | Performed by: DERMATOLOGY

## 2021-02-17 NOTE — PROGRESS NOTES
Patient's Name: Emilee Montgomery  MRN: <[id number]>  YOB: 1990  Date of Visit: 2/17/2021  Primary Care Provider: Ann Sanchez MD  Referring Provider: No ref. provider found    Subjective:   Chief Complaint:   Lesion(s) (warts on bottom of both feet, and tryed wart peel and they are still there.)      History of Present Illness:   Emilee Montgomery is a 26 y.o. female who presents in clinic today for a follow up on warts and a new concern. Last office visit: 11/9/20    At their last visit they had their warts treated with curettage and cryotherapy. She was also prescribed compounded Wartpeel, which she has been applying once daily. Patient reports the warts on feet are still present, however they have improved slightly and the lesion on her finger has resolved. She has a new lesion of concern. Reports her  noticed a new mole on her upper back developing in a scar from another mole that has been removed several years ago. Denies any pain, itching or bleeding at the site. Denies any other new, changing or non healing lesions.       Past medical and surgical histories, current medications, allergies, social and family histories were reviewed with no change since the last visit        Allergies:  No Known Allergies    Current Medications:  Current Outpatient Medications   Medication Sig Dispense Refill    traZODone (DESYREL) 50 MG tablet TAKE 1 TABLET BY MOUTH EVERY DAY AT BEDTIME AS NEEDED FOR SLEEP 30 tablet 1    FLUoxetine (PROZAC) 20 MG capsule TAKE 2 CAPSULES BY MOUTH EVERY DAY 60 capsule 3    amitriptyline (ELAVIL) 25 MG tablet TAKE 1 TABLET BY MOUTH EVERY DAY AT NIGHT 30 tablet 5    SUMAtriptan (IMITREX) 50 MG tablet TAKE 1 TABLET BY MOUTH ONCE AS NEEDED FOR MIGRAINE 9 tablet 2    hydrocortisone (ANUSOL-HC) 2.5 % CREA rectal cream Apply sparingly to affected area twice daily for up to 7 days (Patient not taking: Reported on 2/17/2021) 28 g 0  Urea (CARMOL) 40 % cream Apply to affected areas on the feet once or twice daily. (Patient not taking: Reported on 2/17/2021) 227 g 2    metFORMIN, MOD, (GLUMETZA) 500 MG extended release tablet Take 1,000 mg by mouth daily (with breakfast)      magnesium oxide (MAG-OX) 400 MG tablet Take 1 tablet by mouth daily (Patient not taking: Reported on 2/17/2021) 30 tablet 2    triamcinolone (KENALOG) 0.1 % cream Apply to affected itchy area twice daily until improved. (Patient not taking: Reported on 2/17/2021) 60 g 2     No current facility-administered medications for this visit. Review of Systems:  Constitutional: No fevers, chills or recent illness. Skin: Skin:As per HPI AND otherwise no new, bleeding or symptomatic skin lesions      Objective:     Vitals:    02/17/21 1141   Temp: 97.2 °F (36.2 °C)       Physical Examination:  General: alert, comfortable, no apparent distress, well-appearing  Psych: alert, oriented and pleasant  Neuro: oriented to person, place, and time  Skin: Areas examined: head including face, lips, conjunctiva and lids, neck, hair/scalp, back, left hand, right hand, digits and nails, Right foot and Left foot      All areas examined were within normal limits except those listed below with the appropriate assessment and plan    Assessment and Plan (with relevant objective exam findings):     1. Verruca plantaris    Location: Left heel, Plantar Rt great toe, Rt 1st webspace, Rt plantar lateral foot  Objective: endophytic, with dilated capillary loops, rough and scaly hyperkeratotic, endophytic flesh-colored, rough and scaly papules with disruption of skin markings with dilated capillary loops. Discussed treatment options of continuing Wartpeel versus cryotherapy. After discussion of treatment options, the patient opted for:  -Cryotherapy. See procedure below.    -Curettage was performed before cryotherapy.     Continue Wartpeel (compounded) daily at night under occlusion Follow up in 6 weeks for re-evaluation and further therapy if needed. 2. Neoplasm uncertain behavior, skin  Location: Left upper back medial  Objective findings: 5 mm Irregular brown papule within a scar. Discussed that this is either a recurrent nevus within a scar from a previous biopsy versus a de javier atypical nevus arising within a scar. Will perform excisional biopsy today. The specimen was sent to pathology for diagnosis. We will call patient with biopsy results as soon as they are available, and discuss treatment options as needed. Wound care was discussed and written instructions also given to patient. 3. Multiple benign melanocytic nevi   Location: back   Objective findings: multiple brown/pink macules and papules without abnormal findings or concerning findings on exam and dermatoscopy    -Counseled the patient that these are benign and need no therapy. Observation for any changes recommended         Follow up:  Return visit in 6 weeks or as needed for change in condition. All questions addressed.      Procedure:   Procedure:  Excision by Punch Technique  Reason for Excision:  R/O dysplasia  Location:  Left upper back medial  Indication:  Neoplasm uncertain behavior, skin  Size of Lesion:  5 mm  Size of Excision including margins: 6mm  Size of Closure:   12 mm Risks, benefits and alternatives were explained and verbal informed consent was obtained. The area was photographed with the patient's permission. The area was cleaned with alcohol and infiltrated with 1% lidocaine    with epinephrine. An excision was obtained to adipose with a 6 mm punch. A full thickness sample was obtained. Closure was with 3.0 prolene sutures. Vaseline ointment and sterile dressing were applied and wound care instructions were given verbally and in writing. The patient tolerated the procedure well with no complications. The patient will be notified by mail or telephone of pathology results and was instructed to call if he has not received notification within three weeks. Sutures are to be removed in 14 days. CPT code for procedure:  35022        Procedure: Cryotherapy  Location(s): Left heel, Plantar Rt great toe, Rt 1st webspace, Rt plantar lateral foot  Indication:  Verruca plantaris  Total Number of Lesions:  8    Risks, benefits and alternatives were explained and verbal informed consent was obtained. The area was photographed with the patient's permission. Each lesion was treated with curettage (trimming) followed by cryotherapy, using liquid nitrogen, for 2 freeze cycles of approximately 10 seconds each. Skin was allowed to thaw completely before each cycle. The patient was advised to have wound examined if problems with healing occur, or evidence of infection develops. Wound care instructions were reviewed with the patient and She was advised that if the site is not completely resolved at follow-up, re-treatment with this or other method should be considered. The patient tolerated the procedure well without complications.                   Umberto Valladares MD. MS

## 2021-02-17 NOTE — PATIENT INSTRUCTIONS
Wound care instructions    1. Keep bandage dry for 24 hours  2. Clean with soap and water twice daily next day  3 Apply Vaseline or Aquaphor ointment twice daily and cover with small dressing or Band-Aid for 10-14 days on extremities and torso, and 5-7 days on face and neck. · Studies show that wounds heal better when covered with ointment and a bandage. 4. Please return as instructed to have the stitches removed. Typically 5-7 days on face and neck and 10-14 days on torso and extremities. FREQUENTLY ASKED QUESTIONS:  · It is OK to get the wound wet when washing or bathing (after 24 hrs). · If bleeding should occur, apply firm direct pressure for 20 minutes CONTINUOUSLY. After 20 minutes, you may check to see if bleeding has stopped. If bleeding persists, please repeat CONTINUOUS PRESSURE for another 20 minutes WITHOUT LOOKING. · If bleeding still persists, call the office at [phone]  · Signs of infection include increased redness extending over 1 centimeter from the wound, increased warmth, yellow to green purulent (pus-like) discharge, and significantly increased tenderness to palpation. If you have any concerns regarding infection or develop fevers or chills, please call. You will be contacted with the results of your procedure when we receive them, usually within two weeks. Please call the office at [phone] between 8 am-4 pm Monday-Friday if you have not heard from us.

## 2021-02-19 LAB — DERMATOLOGY PATHOLOGY REPORT: NORMAL

## 2021-03-15 RX ORDER — TRAZODONE HYDROCHLORIDE 50 MG/1
TABLET ORAL
Qty: 30 TABLET | Refills: 1 | Status: SHIPPED | OUTPATIENT
Start: 2021-03-15 | End: 2021-08-13

## 2021-03-22 RX ORDER — AMITRIPTYLINE HYDROCHLORIDE 25 MG/1
TABLET, FILM COATED ORAL
Qty: 30 TABLET | Refills: 5 | Status: SHIPPED | OUTPATIENT
Start: 2021-03-22 | End: 2021-08-13

## 2021-04-12 ENCOUNTER — OFFICE VISIT (OUTPATIENT)
Dept: DERMATOLOGY | Age: 31
End: 2021-04-12
Payer: COMMERCIAL

## 2021-04-12 VITALS — TEMPERATURE: 97.4 F

## 2021-04-12 DIAGNOSIS — L81.0 POST-INFLAMMATORY HYPERPIGMENTATION: ICD-10-CM

## 2021-04-12 DIAGNOSIS — B07.0 VERRUCA PLANTARIS: Primary | ICD-10-CM

## 2021-04-12 PROCEDURE — 99213 OFFICE O/P EST LOW 20 MIN: CPT | Performed by: DERMATOLOGY

## 2021-04-12 PROCEDURE — 1036F TOBACCO NON-USER: CPT | Performed by: DERMATOLOGY

## 2021-04-12 PROCEDURE — G8417 CALC BMI ABV UP PARAM F/U: HCPCS | Performed by: DERMATOLOGY

## 2021-04-12 PROCEDURE — G8427 DOCREV CUR MEDS BY ELIG CLIN: HCPCS | Performed by: DERMATOLOGY

## 2021-04-12 NOTE — PROGRESS NOTES
Patient's Name: Erma Faulkner  MRN: <[id number]>  YOB: 1990  Date of Visit: 4/12/2021  Primary Care Provider: Anika Quintana MD  Referring Provider: No ref. provider found    Subjective:   Chief Complaint:   Follow-up (Warts on bottom of feet.)      History of Present Illness:   Erma Faulkner is a 26 y.o. female who presents in clinic today for a 7 week(s) follow up on plantar warts. Last office visit: 2/17/21    At their last visit the warts were treated with curettage and cryotherapy. Patient has been using compounded Wartpeel     Patient reports improvement of the lesions. Denies pain. She does report lesion not completely gone. Past medical and surgical histories, current medications, allergies, social and family histories were reviewed with no change since the last visit        Allergies:  No Known Allergies    Current Medications:  Current Outpatient Medications   Medication Sig Dispense Refill    amitriptyline (ELAVIL) 25 MG tablet TAKE 1 TABLET BY MOUTH EVERY DAY AT NIGHT 30 tablet 5    FLUoxetine (PROZAC) 20 MG capsule TAKE 2 CAPSULES BY MOUTH EVERY DAY 60 capsule 3    SUMAtriptan (IMITREX) 50 MG tablet TAKE 1 TABLET BY MOUTH ONCE AS NEEDED FOR MIGRAINE 9 tablet 2    traZODone (DESYREL) 50 MG tablet TAKE 1 TABLET BY MOUTH EVERY DAY AT BEDTIME AS NEEDED FOR SLEEP (Patient not taking: Reported on 4/12/2021) 30 tablet 1    hydrocortisone (ANUSOL-HC) 2.5 % CREA rectal cream Apply sparingly to affected area twice daily for up to 7 days (Patient not taking: Reported on 2/17/2021) 28 g 0    Urea (CARMOL) 40 % cream Apply to affected areas on the feet once or twice daily.  (Patient not taking: Reported on 2/17/2021) 227 g 2    metFORMIN, MOD, (GLUMETZA) 500 MG extended release tablet Take 1,000 mg by mouth daily (with breakfast)      magnesium oxide (MAG-OX) 400 MG tablet Take 1 tablet by mouth daily (Patient not taking: Reported on 2/17/2021) 30 tablet 2    triamcinolone (KENALOG) 0.1 % cream Apply to affected itchy area twice daily until improved. (Patient not taking: Reported on 2/17/2021) 60 g 2     No current facility-administered medications for this visit. Review of Systems:  Constitutional: No fevers, chills or recent illness. Skin: Skin:As per HPI AND otherwise no new, bleeding or symptomatic skin lesions      Objective:     Vitals:    04/12/21 1252   Temp: 97.4 °F (36.3 °C)       Physical Examination:  General: alert, comfortable, no apparent distress, well-appearing  Psych: alert, oriented and pleasant  Neuro: oriented to person, place, and time  Skin: Areas examined: head including face, lips, conjunctiva and lids, neck, hair/scalp, left hand, right hand, digits and nails and Right foot      All areas examined were within normal limits except those listed below with the appropriate assessment and plan    Assessment and Plan (with relevant objective exam findings):     1. Verruca plantaris  Location: Lt heel, Plantar Rt great toe, Rt 1st webspace, Rt plantar lateral foot   Objective:  hyperkeratotic flesh-colored papules with disruption of skin markings. After discussion of treatment options, the patient opted for:  -Cryotherapy. See procedure below. - She will continue to apply Wartpeel daily    -Curettage was performed before  Follow up 1 month for re-evaluation and further therapy if needed. 2. Post-inflammatory hyperpigmentation  Location: Left heel  Findings: hyperpigmented macule    This may be related to the inflammation from wartpeel and it will likel fade gradually with time. Follow up:  Return visit in 6-8 weeks or as needed for change in condition. All questions addressed.      Procedure:     Procedure: Cryotherapy  Location(s):  Lt heel, Plantar Rt great toe, Rt 1st webspace, Rt plantar lateral foot  Indication:  Verruca plantaris  Total Number of Lesions: 4     Risks, benefits and alternatives were explained and verbal informed consent was obtained. The area was not photographed. Each lesion was treated with cryotherapy, using liquid nitrogen, for 2 freeze cycles of approximately 10 seconds each. Skin was allowed to thaw completely before each cycle. The patient was advised to have wound examined if problems with healing occur, or evidence of infection develops. Wound care instructions were reviewed with the patient and She was advised that if the site is not completely resolved at follow-up, re-treatment with this or other method should be considered. The patient tolerated the procedure well without complications.         Laury Ghotra MD. MS

## 2021-05-14 RX ORDER — FLUOXETINE HYDROCHLORIDE 20 MG/1
CAPSULE ORAL
Qty: 180 CAPSULE | Refills: 1 | Status: SHIPPED | OUTPATIENT
Start: 2021-05-14 | End: 2021-12-13

## 2021-05-24 ENCOUNTER — OFFICE VISIT (OUTPATIENT)
Dept: DERMATOLOGY | Age: 31
End: 2021-05-24
Payer: COMMERCIAL

## 2021-05-24 VITALS — TEMPERATURE: 97.9 F

## 2021-05-24 DIAGNOSIS — L84 PLANTAR CALLOSITY: ICD-10-CM

## 2021-05-24 DIAGNOSIS — B07.0 VERRUCA PLANTARIS: Primary | ICD-10-CM

## 2021-05-24 PROCEDURE — 1036F TOBACCO NON-USER: CPT | Performed by: DERMATOLOGY

## 2021-05-24 PROCEDURE — 99213 OFFICE O/P EST LOW 20 MIN: CPT | Performed by: DERMATOLOGY

## 2021-05-24 PROCEDURE — G8417 CALC BMI ABV UP PARAM F/U: HCPCS | Performed by: DERMATOLOGY

## 2021-05-24 PROCEDURE — G8427 DOCREV CUR MEDS BY ELIG CLIN: HCPCS | Performed by: DERMATOLOGY

## 2021-05-24 PROCEDURE — 17110 DESTRUCTION B9 LES UP TO 14: CPT | Performed by: DERMATOLOGY

## 2021-05-24 PROCEDURE — 11056 PARNG/CUTG B9 HYPRKR LES 2-4: CPT | Performed by: DERMATOLOGY
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
## 2021-08-12 ENCOUNTER — VIRTUAL VISIT (OUTPATIENT)
Dept: INTERNAL MEDICINE CLINIC | Age: 31
End: 2021-08-12
Payer: COMMERCIAL

## 2021-08-12 DIAGNOSIS — J01.90 SUBACUTE SINUSITIS, UNSPECIFIED LOCATION: Primary | ICD-10-CM

## 2021-08-12 PROCEDURE — G8427 DOCREV CUR MEDS BY ELIG CLIN: HCPCS | Performed by: INTERNAL MEDICINE

## 2021-08-12 PROCEDURE — 99213 OFFICE O/P EST LOW 20 MIN: CPT | Performed by: INTERNAL MEDICINE

## 2021-08-12 RX ORDER — AMOXICILLIN 500 MG/1
500 CAPSULE ORAL 3 TIMES DAILY
Qty: 21 CAPSULE | Refills: 0 | Status: SHIPPED | OUTPATIENT
Start: 2021-08-12 | End: 2021-08-19

## 2021-08-12 RX ORDER — NORETHINDRONE ACETATE AND ETHINYL ESTRADIOL 1; .02 MG/1; MG/1
TABLET ORAL
COMMUNITY
Start: 2021-05-24

## 2021-08-12 SDOH — ECONOMIC STABILITY: FOOD INSECURITY: WITHIN THE PAST 12 MONTHS, YOU WORRIED THAT YOUR FOOD WOULD RUN OUT BEFORE YOU GOT MONEY TO BUY MORE.: NEVER TRUE

## 2021-08-12 SDOH — ECONOMIC STABILITY: FOOD INSECURITY: WITHIN THE PAST 12 MONTHS, THE FOOD YOU BOUGHT JUST DIDN'T LAST AND YOU DIDN'T HAVE MONEY TO GET MORE.: NEVER TRUE

## 2021-08-12 ASSESSMENT — PATIENT HEALTH QUESTIONNAIRE - PHQ9
SUM OF ALL RESPONSES TO PHQ QUESTIONS 1-9: 1
SUM OF ALL RESPONSES TO PHQ QUESTIONS 1-9: 1
1. LITTLE INTEREST OR PLEASURE IN DOING THINGS: 1
SUM OF ALL RESPONSES TO PHQ QUESTIONS 1-9: 1
2. FEELING DOWN, DEPRESSED OR HOPELESS: 0
SUM OF ALL RESPONSES TO PHQ9 QUESTIONS 1 & 2: 1

## 2021-08-12 ASSESSMENT — SOCIAL DETERMINANTS OF HEALTH (SDOH): HOW HARD IS IT FOR YOU TO PAY FOR THE VERY BASICS LIKE FOOD, HOUSING, MEDICAL CARE, AND HEATING?: NOT HARD AT ALL

## 2021-08-12 NOTE — PROGRESS NOTES
appropriate. The patient was located in a state where the provider was credentialed to provide care. An electronic signature was used to authenticate this note.     --Justina Epley, MD

## 2021-11-20 ENCOUNTER — APPOINTMENT (OUTPATIENT)
Dept: CT IMAGING | Age: 31
End: 2021-11-20
Payer: COMMERCIAL

## 2021-11-20 ENCOUNTER — APPOINTMENT (OUTPATIENT)
Dept: GENERAL RADIOLOGY | Age: 31
End: 2021-11-20
Payer: COMMERCIAL

## 2021-11-20 ENCOUNTER — HOSPITAL ENCOUNTER (EMERGENCY)
Age: 31
Discharge: HOME | End: 2021-11-20
Attending: STUDENT IN AN ORGANIZED HEALTH CARE EDUCATION/TRAINING PROGRAM
Payer: COMMERCIAL

## 2021-11-20 VITALS
TEMPERATURE: 98.1 F | HEART RATE: 89 BPM | WEIGHT: 130 LBS | RESPIRATION RATE: 16 BRPM | SYSTOLIC BLOOD PRESSURE: 109 MMHG | OXYGEN SATURATION: 98 % | BODY MASS INDEX: 21.66 KG/M2 | DIASTOLIC BLOOD PRESSURE: 79 MMHG | HEIGHT: 65 IN

## 2021-11-20 DIAGNOSIS — S06.0X0A CONCUSSION WITHOUT LOSS OF CONSCIOUSNESS, INITIAL ENCOUNTER: ICD-10-CM

## 2021-11-20 DIAGNOSIS — M54.2 NECK PAIN: Primary | ICD-10-CM

## 2021-11-20 DIAGNOSIS — M25.511 ACUTE PAIN OF RIGHT SHOULDER: ICD-10-CM

## 2021-11-20 DIAGNOSIS — M79.602 LEFT ARM PAIN: ICD-10-CM

## 2021-11-20 PROCEDURE — 73060 X-RAY EXAM OF HUMERUS: CPT

## 2021-11-20 PROCEDURE — 99284 EMERGENCY DEPT VISIT MOD MDM: CPT

## 2021-11-20 PROCEDURE — 73030 X-RAY EXAM OF SHOULDER: CPT

## 2021-11-20 PROCEDURE — 70450 CT HEAD/BRAIN W/O DYE: CPT

## 2021-11-20 PROCEDURE — 73010 X-RAY EXAM OF SHOULDER BLADE: CPT

## 2021-11-20 PROCEDURE — 73090 X-RAY EXAM OF FOREARM: CPT

## 2021-11-20 PROCEDURE — 72125 CT NECK SPINE W/O DYE: CPT

## 2021-11-20 RX ORDER — IBUPROFEN 400 MG/1
400 TABLET ORAL EVERY 6 HOURS PRN
Qty: 28 TABLET | Refills: 0 | Status: SHIPPED | OUTPATIENT
Start: 2021-11-20 | End: 2021-11-27

## 2021-11-20 RX ORDER — METHOCARBAMOL 500 MG/1
500 TABLET, FILM COATED ORAL 4 TIMES DAILY
Qty: 40 TABLET | Refills: 0 | Status: SHIPPED | OUTPATIENT
Start: 2021-11-20 | End: 2021-11-30

## 2021-11-20 ASSESSMENT — ENCOUNTER SYMPTOMS
DIARRHEA: 0
ABDOMINAL DISTENTION: 0
GASTROINTESTINAL NEGATIVE: 1
VOMITING: 0
COLOR CHANGE: 0
SHORTNESS OF BREATH: 0
ABDOMINAL PAIN: 0
BACK PAIN: 0
EYES NEGATIVE: 1
RESPIRATORY NEGATIVE: 1
WHEEZING: 0
COUGH: 0
STRIDOR: 0
NAUSEA: 0
CONSTIPATION: 0

## 2021-11-20 NOTE — ED PROVIDER NOTES
[address] [facility] RESIDENT NOTE       Date of evaluation: 11/20/2021    Chief Complaint     Fall (Fall down steps while carrying her child), Arm Pain, Shoulder Pain, and Neck Pain (C collar applied)      History of Present Illness     Myriam Lambert is a 33 y.o. female who presents with left arm, right shoulder and neck pain after falling down the stairs. Pt was carrying her child on the stairs when she tripped on her socks and fell down the stairs (3-4). This happened an hour before presenting to the hospital.   Endorses dizzingness, pain in her left arm proximal and distal to the elbow, neck pain worse with movement, and headache. Pt took ibuprofen to help with her pain. She denies any neurodeficits such as weakness, sensation changes, tingling or numbness. Review of Systems     Review of Systems   Constitutional: Negative for activity change, appetite change, chills, diaphoresis, fatigue and fever. HENT: Negative. Negative for congestion. Eyes: Negative. Respiratory: Negative. Negative for cough, shortness of breath, wheezing and stridor. Cardiovascular: Negative. Negative for chest pain, palpitations and leg swelling. Gastrointestinal: Negative. Negative for abdominal distention, abdominal pain, constipation, diarrhea, nausea and vomiting. Musculoskeletal: Positive for neck pain. Negative for back pain, joint swelling and myalgias. Right shoulder pain, left arm pain proximal/distal to elbow   Skin: Negative for color change, pallor, rash and wound. Neurological: Positive for dizziness and headaches. Negative for tremors, seizures, syncope, facial asymmetry, speech difficulty, weakness and numbness. Past Medical, Surgical, Family, and Social History     She has a past medical history of Anxiety, Depression, Infertility, female, and PCOS (polycystic ovarian syndrome).   She has a past surgical history that includes Dilation and curettage of uterus and [place] tooth extraction (2009). Her family history includes Cancer in her paternal grandfather; Colon Cancer in her maternal grandfather; Diabetes in her maternal grandfather; Devota Evens / [place] in her sister. She reports that she has never smoked. She has never used smokeless tobacco. She reports that she does not drink alcohol and does not use drugs. Medications     Previous Medications    FLUOXETINE (PROZAC) 20 MG CAPSULE    TAKE 2 CAPSULES BY MOUTH EVERY DAY    NORETHINDRONE-ETHINYL ESTRADIOL (JUNEL 1/20) 1-20 MG-MCG PER TABLET    TAKE 1 TABLET BY MOUTH EVERY DAY    SUMATRIPTAN (IMITREX) 50 MG TABLET    TAKE 1 TABLET BY MOUTH ONCE AS NEEDED FOR MIGRAINE       Allergies     She has No Known Allergies. Physical Exam     INITIAL VITALS: BP: 115/79, Temp: 98.1 °F (36.7 °C), Pulse: 83, Resp: 18, SpO2: 100 %   Physical Exam  Constitutional:       Appearance: Normal appearance. HENT:      Head: Normocephalic and atraumatic. Cardiovascular:      Rate and Rhythm: Normal rate and regular rhythm. Pulses: Normal pulses. Heart sounds: Normal heart sounds. Pulmonary:      Effort: Pulmonary effort is normal.      Breath sounds: Normal breath sounds. No wheezing or rales. Abdominal:      General: Abdomen is flat. Bowel sounds are normal.      Palpations: Abdomen is soft. Musculoskeletal:         General: Tenderness (tenderness on palpation in right paraspinal cervical C2-C3, right shoulder blade, left arm proximal and distal to elbow) present. No swelling. Normal range of motion. Cervical back: Normal range of motion and neck supple. Skin:     General: Skin is warm and dry. Capillary Refill: Capillary refill takes less than 2 seconds. Neurological:      Mental Status: She is alert.          Diagnostic Results     EKG   RADIOLOGY:  CT CERVICAL SPINE WO CONTRAST   Final Result      Head: Normal.      Cervical spine: Normal.      CT HEAD WO CONTRAST   Final Result      Head: Normal.      Cervical spine: Normal.      XR HUMERUS LEFT (MIN 2 VIEWS)   Final Result      Right shoulder: Normal.      Right scapula: Normal.      Left humerus: Normal.      Left forearm: Normal.      XR RADIUS ULNA LEFT (2 VIEWS)   Final Result      Right shoulder: Normal.      Right scapula: Normal.      Left humerus: Normal.      Left forearm: Normal.      XR SCAPULA RIGHT (COMPLETE)   Final Result      Right shoulder: Normal.      Right scapula: Normal.      Left humerus: Normal.      Left forearm: Normal.      XR SHOULDER RIGHT (MIN 2 VIEWS)   Final Result      Right shoulder: Normal.      Right scapula: Normal.      Left humerus: Normal.      Left forearm: Normal.          LABS:   No results found for this visit on 11/20/21. RECENT VITALS:  BP: 115/79, Temp: 98.1 °F (36.7 °C),Pulse: 83, Resp: 18, SpO2: 100 %     Procedures         ED Course     Nursing Notes, Past Medical Hx, Past Surgical Hx, Social Hx, Allergies, and FamilyHx were reviewed. The patient was giventhe following medications:  Orders Placed This Encounter   Medications    methocarbamol (ROBAXIN) 500 MG tablet     Sig: Take 1 tablet by mouth 4 times daily for 10 days     Dispense:  40 tablet     Refill:  0    ibuprofen (ADVIL;MOTRIN) 400 MG tablet     Sig: Take 1 tablet by mouth every 6 hours as needed for Pain     Dispense:  28 tablet     Refill:  0       CONSULTS:  None    MEDICAL DECISION MAKING / ASSESSMENT / Derik Lurdes is a 33 y.o. female no significant past medical history who presented with left arm, right shoulder, neck pain and headache after having a mechanical fall. Pt had no neuro deficits or signs of radiculopathy. No midline or spinal cervical tenderness appreciated on exam. Pt states she is not pregnant. No pregnancy test done in the ED. Neck collar applied until CT cervical result was back given neck pain and inability of pt to turn her neck.    CT head WO contrast was ordered as pt hit her head and was complaining of dizziness. CT cervical WO contrast was ordered given neck pain to rule out any cervical etiology. All imaging came back normal with no signs of head bleed or fracture. Neck collar was removed. Pt still experiences limited range of neck movement 2/2 pain. Pt possibly experienced head concussion. Her neck, R shoulder and left arm pain is likely musculoskeletal 2/2 the fall. She was given instructions on discharge. Pt was given a prescription of Robaxin for 10 days as needed for muscle pain. This patient was also evaluated by the attending physician. All care plans were discussed and agreed upon. Clinical Impression     1. Neck pain    2. Acute pain of right shoulder    3. Left arm pain    4. Concussion without loss of consciousness, initial encounter        Disposition     PATIENT REFERRED TO:  Donald Dejesus MD  [facility]  [address] [place] [address]  [phone]    In 1 week        DISCHARGE MEDICATIONS:  New Prescriptions    IBUPROFEN (ADVIL;MOTRIN) 400 MG TABLET    Take 1 tablet by mouth every 6 hours as needed for Pain    METHOCARBAMOL (ROBAXIN) 500 MG TABLET    Take 1 tablet by mouth 4 times daily for 10 days       DISPOSITION Decision To Discharge 11/20/2021 03:03:21 PM       Claudeen Brill, MD  Resident  11/20/21 1524       Claudeen Brill, MD  Resident  11/20/21 1526       Claudeen Brill, MD  Resident  11/20/21 8665

## 2021-11-20 NOTE — ED PROVIDER NOTES
ED Attending Attestation Note     Date of evaluation: 11/20/2021    This patient was seen by the resident. I have seen and examined the patient, agree with the workup, evaluation, management and diagnosis. The care plan has been discussed. My assessment reveals 25-year-old female presenting with a chief complaint of neck pain, headache after mechanical fall downstairs. Denies weakness or numbness, does complain of some slight dizziness. No change in vision. Will image head, neck. Continue to monitor    No midline cervical tenderness, some paraspinal tenderness, on the right side. Imaging negative for acute fracture, discussed possible concussion with patient, will discharge with return precautions, Robaxin. Patient denies any chance of being pregnant currently.   Again states an accidental fall down the stairs after misstepping, denies any other means of injury     Ocie Runner, MD  11/20/21 [address], MD  11/20/21 3104

## 2021-12-13 RX ORDER — FLUOXETINE HYDROCHLORIDE 20 MG/1
CAPSULE ORAL
Qty: 180 CAPSULE | Refills: 1 | Status: SHIPPED | OUTPATIENT
Start: 2021-12-13 | End: 2022-01-11 | Stop reason: SDUPTHER

## 2022-01-11 RX ORDER — FLUOXETINE HYDROCHLORIDE 20 MG/1
40 CAPSULE ORAL DAILY
Qty: 180 CAPSULE | Refills: 1 | Status: SHIPPED | OUTPATIENT
Start: 2022-01-11